# Patient Record
Sex: MALE | URBAN - METROPOLITAN AREA
[De-identification: names, ages, dates, MRNs, and addresses within clinical notes are randomized per-mention and may not be internally consistent; named-entity substitution may affect disease eponyms.]

---

## 2017-05-04 PROBLEM — E66.9 OBESITY: Status: ACTIVE | Noted: 2017-05-04

## 2017-05-04 PROBLEM — R05.9 COUGH: Status: ACTIVE | Noted: 2017-05-04

## 2017-05-04 PROBLEM — E78.49 FAMILIAL MULTIPLE LIPOPROTEIN-TYPE HYPERLIPIDEMIA: Status: ACTIVE | Noted: 2017-05-04

## 2017-05-04 PROBLEM — Z23 NEED FOR PROPHYLACTIC VACCINATION AND INOCULATION AGAINST INFLUENZA: Status: ACTIVE | Noted: 2017-05-04

## 2017-05-04 PROBLEM — L91.9 HYPERTROPHIC AND ATROPHIC CONDITION OF SKIN: Status: ACTIVE | Noted: 2017-05-04

## 2017-05-04 PROBLEM — Z00.00 ROUTINE GENERAL MEDICAL EXAMINATION AT A HEALTH CARE FACILITY: Status: ACTIVE | Noted: 2017-05-04

## 2017-05-04 PROBLEM — L90.9 HYPERTROPHIC AND ATROPHIC CONDITION OF SKIN: Status: ACTIVE | Noted: 2017-05-04

## 2017-05-04 PROBLEM — J30.9 ALLERGIC RHINITIS: Status: ACTIVE | Noted: 2017-05-04

## 2018-08-07 PROBLEM — Z23 NEED FOR PROPHYLACTIC VACCINATION AND INOCULATION AGAINST INFLUENZA: Status: RESOLVED | Noted: 2017-05-04 | Resolved: 2018-08-07

## 2018-08-07 PROBLEM — J30.9 ALLERGIC RHINITIS: Status: RESOLVED | Noted: 2017-05-04 | Resolved: 2018-08-07

## 2018-08-07 PROBLEM — R05.9 COUGH: Status: RESOLVED | Noted: 2017-05-04 | Resolved: 2018-08-07

## 2018-08-07 PROBLEM — L91.9 HYPERTROPHIC AND ATROPHIC CONDITION OF SKIN: Status: RESOLVED | Noted: 2017-05-04 | Resolved: 2018-08-07

## 2018-08-07 PROBLEM — L90.9 HYPERTROPHIC AND ATROPHIC CONDITION OF SKIN: Status: RESOLVED | Noted: 2017-05-04 | Resolved: 2018-08-07

## 2018-08-07 PROBLEM — Z00.00 ROUTINE GENERAL MEDICAL EXAMINATION AT A HEALTH CARE FACILITY: Status: RESOLVED | Noted: 2017-05-04 | Resolved: 2018-08-07

## 2021-04-03 ENCOUNTER — IMMUNIZATION (OUTPATIENT)
Dept: LAB | Age: 56
End: 2021-04-03

## 2021-04-03 DIAGNOSIS — Z23 NEED FOR VACCINATION: Primary | ICD-10-CM

## 2021-04-03 PROCEDURE — 91300 COVID 19 PFIZER-BIONTECH: CPT | Performed by: HOSPITALIST

## 2021-04-03 PROCEDURE — 0001A COVID 19 PFIZER-BIONTECH: CPT | Performed by: HOSPITALIST

## 2021-04-24 ENCOUNTER — IMMUNIZATION (OUTPATIENT)
Dept: LAB | Age: 56
End: 2021-04-24
Attending: HOSPITALIST

## 2021-04-24 DIAGNOSIS — Z23 NEED FOR VACCINATION: Primary | ICD-10-CM

## 2021-04-24 PROCEDURE — 91300 COVID 19 PFIZER-BIONTECH: CPT | Performed by: HOSPITALIST

## 2021-04-24 PROCEDURE — 0002A COVID 19 PFIZER-BIONTECH: CPT | Performed by: HOSPITALIST

## 2023-11-13 ENCOUNTER — ANESTHESIA EVENT (OUTPATIENT)
Dept: SURGERY | Facility: HOSPITAL | Age: 58
End: 2023-11-13
Payer: COMMERCIAL

## 2023-11-14 ENCOUNTER — HOSPITAL ENCOUNTER (OUTPATIENT)
Facility: HOSPITAL | Age: 58
Discharge: HOME | End: 2023-11-15
Attending: UROLOGY | Admitting: UROLOGY
Payer: COMMERCIAL

## 2023-11-14 ENCOUNTER — ANESTHESIA (OUTPATIENT)
Dept: SURGERY | Facility: HOSPITAL | Age: 58
End: 2023-11-14
Payer: COMMERCIAL

## 2023-11-14 PROBLEM — N13.8 BPH WITH URINARY OBSTRUCTION: Status: ACTIVE | Noted: 2023-11-14

## 2023-11-14 PROBLEM — N40.1 BPH WITH URINARY OBSTRUCTION: Status: ACTIVE | Noted: 2023-11-14

## 2023-11-14 LAB
ANION GAP SERPL CALC-SCNC: 5 MMOL/L (ref 0–18)
BUN BLD-MCNC: 12 MG/DL (ref 9–23)
CALCIUM BLD-MCNC: 8.8 MG/DL (ref 8.5–10.1)
CHLORIDE SERPL-SCNC: 106 MMOL/L (ref 98–112)
CO2 SERPL-SCNC: 26 MMOL/L (ref 21–32)
CREAT BLD-MCNC: 1.06 MG/DL
EGFRCR SERPLBLD CKD-EPI 2021: 82 ML/MIN/1.73M2 (ref 60–?)
ERYTHROCYTE [DISTWIDTH] IN BLOOD BY AUTOMATED COUNT: 13.2 %
GLUCOSE BLD-MCNC: 104 MG/DL (ref 70–99)
HCT VFR BLD AUTO: 46.2 %
HGB BLD-MCNC: 15.1 G/DL
MCH RBC QN AUTO: 28.2 PG (ref 26–34)
MCHC RBC AUTO-ENTMCNC: 32.7 G/DL (ref 31–37)
MCV RBC AUTO: 86.4 FL
OSMOLALITY SERPL CALC.SUM OF ELEC: 284 MOSM/KG (ref 275–295)
PLATELET # BLD AUTO: 150 10(3)UL (ref 150–450)
POTASSIUM SERPL-SCNC: 4.2 MMOL/L (ref 3.5–5.1)
RBC # BLD AUTO: 5.35 X10(6)UL
SODIUM SERPL-SCNC: 137 MMOL/L (ref 136–145)
WBC # BLD AUTO: 11.3 X10(3) UL (ref 4–11)

## 2023-11-14 PROCEDURE — 88305 TISSUE EXAM BY PATHOLOGIST: CPT | Performed by: UROLOGY

## 2023-11-14 PROCEDURE — 0TJB8ZZ INSPECTION OF BLADDER, VIA NATURAL OR ARTIFICIAL OPENING ENDOSCOPIC: ICD-10-PCS | Performed by: UROLOGY

## 2023-11-14 PROCEDURE — 85027 COMPLETE CBC AUTOMATED: CPT | Performed by: UROLOGY

## 2023-11-14 PROCEDURE — 80048 BASIC METABOLIC PNL TOTAL CA: CPT | Performed by: UROLOGY

## 2023-11-14 PROCEDURE — 0VT08ZZ RESECTION OF PROSTATE, VIA NATURAL OR ARTIFICIAL OPENING ENDOSCOPIC: ICD-10-PCS | Performed by: UROLOGY

## 2023-11-14 RX ORDER — SODIUM CHLORIDE 9 MG/ML
INJECTION, SOLUTION INTRAVENOUS CONTINUOUS
Status: DISCONTINUED | OUTPATIENT
Start: 2023-11-14 | End: 2023-11-15

## 2023-11-14 RX ORDER — CEFAZOLIN SODIUM/WATER 2 G/20 ML
2 SYRINGE (ML) INTRAVENOUS ONCE
Status: COMPLETED | OUTPATIENT
Start: 2023-11-14 | End: 2023-11-14

## 2023-11-14 RX ORDER — LIDOCAINE HYDROCHLORIDE 10 MG/ML
INJECTION, SOLUTION EPIDURAL; INFILTRATION; INTRACAUDAL; PERINEURAL AS NEEDED
Status: DISCONTINUED | OUTPATIENT
Start: 2023-11-14 | End: 2023-11-14 | Stop reason: SURG

## 2023-11-14 RX ORDER — HYDROMORPHONE HYDROCHLORIDE 1 MG/ML
0.4 INJECTION, SOLUTION INTRAMUSCULAR; INTRAVENOUS; SUBCUTANEOUS EVERY 2 HOUR PRN
Status: DISCONTINUED | OUTPATIENT
Start: 2023-11-14 | End: 2023-11-15

## 2023-11-14 RX ORDER — HEPARIN SODIUM 5000 [USP'U]/ML
5000 INJECTION, SOLUTION INTRAVENOUS; SUBCUTANEOUS EVERY 8 HOURS SCHEDULED
Status: DISCONTINUED | OUTPATIENT
Start: 2023-11-15 | End: 2023-11-15

## 2023-11-14 RX ORDER — ACETAMINOPHEN 500 MG
1000 TABLET ORAL ONCE AS NEEDED
Status: DISCONTINUED | OUTPATIENT
Start: 2023-11-14 | End: 2023-11-14 | Stop reason: HOSPADM

## 2023-11-14 RX ORDER — DEXAMETHASONE SODIUM PHOSPHATE 4 MG/ML
VIAL (ML) INJECTION AS NEEDED
Status: DISCONTINUED | OUTPATIENT
Start: 2023-11-14 | End: 2023-11-14 | Stop reason: SURG

## 2023-11-14 RX ORDER — SODIUM CHLORIDE, SODIUM LACTATE, POTASSIUM CHLORIDE, CALCIUM CHLORIDE 600; 310; 30; 20 MG/100ML; MG/100ML; MG/100ML; MG/100ML
INJECTION, SOLUTION INTRAVENOUS CONTINUOUS
Status: DISCONTINUED | OUTPATIENT
Start: 2023-11-14 | End: 2023-11-14

## 2023-11-14 RX ORDER — PROCHLORPERAZINE EDISYLATE 5 MG/ML
5 INJECTION INTRAMUSCULAR; INTRAVENOUS EVERY 8 HOURS PRN
Status: DISCONTINUED | OUTPATIENT
Start: 2023-11-14 | End: 2023-11-14 | Stop reason: HOSPADM

## 2023-11-14 RX ORDER — HYDROCODONE BITARTRATE AND ACETAMINOPHEN 10; 325 MG/1; MG/1
1 TABLET ORAL ONCE AS NEEDED
Status: DISCONTINUED | OUTPATIENT
Start: 2023-11-14 | End: 2023-11-14 | Stop reason: HOSPADM

## 2023-11-14 RX ORDER — ACETAMINOPHEN 500 MG
1000 TABLET ORAL ONCE
Status: DISCONTINUED | OUTPATIENT
Start: 2023-11-14 | End: 2023-11-14 | Stop reason: HOSPADM

## 2023-11-14 RX ORDER — MEPERIDINE HYDROCHLORIDE 25 MG/ML
12.5 INJECTION INTRAMUSCULAR; INTRAVENOUS; SUBCUTANEOUS AS NEEDED
Status: DISCONTINUED | OUTPATIENT
Start: 2023-11-14 | End: 2023-11-14 | Stop reason: HOSPADM

## 2023-11-14 RX ORDER — HYDROCODONE BITARTRATE AND ACETAMINOPHEN 10; 325 MG/1; MG/1
2 TABLET ORAL ONCE AS NEEDED
Status: DISCONTINUED | OUTPATIENT
Start: 2023-11-14 | End: 2023-11-14 | Stop reason: HOSPADM

## 2023-11-14 RX ORDER — ONDANSETRON 2 MG/ML
4 INJECTION INTRAMUSCULAR; INTRAVENOUS EVERY 6 HOURS PRN
Status: DISCONTINUED | OUTPATIENT
Start: 2023-11-14 | End: 2023-11-15

## 2023-11-14 RX ORDER — ACETAMINOPHEN 325 MG/1
650 TABLET ORAL EVERY 6 HOURS PRN
Status: DISCONTINUED | OUTPATIENT
Start: 2023-11-14 | End: 2023-11-15

## 2023-11-14 RX ORDER — MIDAZOLAM HYDROCHLORIDE 1 MG/ML
1 INJECTION INTRAMUSCULAR; INTRAVENOUS EVERY 5 MIN PRN
Status: DISCONTINUED | OUTPATIENT
Start: 2023-11-14 | End: 2023-11-14 | Stop reason: HOSPADM

## 2023-11-14 RX ORDER — HYDROMORPHONE HYDROCHLORIDE 1 MG/ML
0.4 INJECTION, SOLUTION INTRAMUSCULAR; INTRAVENOUS; SUBCUTANEOUS EVERY 5 MIN PRN
Status: DISCONTINUED | OUTPATIENT
Start: 2023-11-14 | End: 2023-11-14 | Stop reason: HOSPADM

## 2023-11-14 RX ORDER — OXYCODONE HYDROCHLORIDE 5 MG/1
10 TABLET ORAL EVERY 4 HOURS PRN
Status: DISCONTINUED | OUTPATIENT
Start: 2023-11-14 | End: 2023-11-15

## 2023-11-14 RX ORDER — HYDROMORPHONE HYDROCHLORIDE 1 MG/ML
0.2 INJECTION, SOLUTION INTRAMUSCULAR; INTRAVENOUS; SUBCUTANEOUS EVERY 5 MIN PRN
Status: DISCONTINUED | OUTPATIENT
Start: 2023-11-14 | End: 2023-11-14 | Stop reason: HOSPADM

## 2023-11-14 RX ORDER — ONDANSETRON 2 MG/ML
INJECTION INTRAMUSCULAR; INTRAVENOUS AS NEEDED
Status: DISCONTINUED | OUTPATIENT
Start: 2023-11-14 | End: 2023-11-14 | Stop reason: SURG

## 2023-11-14 RX ORDER — HYDROMORPHONE HYDROCHLORIDE 1 MG/ML
0.6 INJECTION, SOLUTION INTRAMUSCULAR; INTRAVENOUS; SUBCUTANEOUS EVERY 5 MIN PRN
Status: DISCONTINUED | OUTPATIENT
Start: 2023-11-14 | End: 2023-11-14 | Stop reason: HOSPADM

## 2023-11-14 RX ORDER — HYDROMORPHONE HYDROCHLORIDE 1 MG/ML
0.8 INJECTION, SOLUTION INTRAMUSCULAR; INTRAVENOUS; SUBCUTANEOUS EVERY 2 HOUR PRN
Status: DISCONTINUED | OUTPATIENT
Start: 2023-11-14 | End: 2023-11-15

## 2023-11-14 RX ORDER — LABETALOL HYDROCHLORIDE 5 MG/ML
5 INJECTION, SOLUTION INTRAVENOUS EVERY 5 MIN PRN
Status: DISCONTINUED | OUTPATIENT
Start: 2023-11-14 | End: 2023-11-14 | Stop reason: HOSPADM

## 2023-11-14 RX ORDER — ROCURONIUM BROMIDE 10 MG/ML
INJECTION, SOLUTION INTRAVENOUS AS NEEDED
Status: DISCONTINUED | OUTPATIENT
Start: 2023-11-14 | End: 2023-11-14 | Stop reason: SURG

## 2023-11-14 RX ORDER — ONDANSETRON 2 MG/ML
4 INJECTION INTRAMUSCULAR; INTRAVENOUS EVERY 6 HOURS PRN
Status: DISCONTINUED | OUTPATIENT
Start: 2023-11-14 | End: 2023-11-14 | Stop reason: HOSPADM

## 2023-11-14 RX ORDER — OXYCODONE HYDROCHLORIDE 5 MG/1
5 TABLET ORAL EVERY 4 HOURS PRN
Status: DISCONTINUED | OUTPATIENT
Start: 2023-11-14 | End: 2023-11-15

## 2023-11-14 RX ORDER — POLYETHYLENE GLYCOL 3350 17 G/17G
17 POWDER, FOR SOLUTION ORAL DAILY PRN
Status: DISCONTINUED | OUTPATIENT
Start: 2023-11-14 | End: 2023-11-15

## 2023-11-14 RX ORDER — SODIUM CHLORIDE, SODIUM LACTATE, POTASSIUM CHLORIDE, CALCIUM CHLORIDE 600; 310; 30; 20 MG/100ML; MG/100ML; MG/100ML; MG/100ML
INJECTION, SOLUTION INTRAVENOUS CONTINUOUS
Status: DISCONTINUED | OUTPATIENT
Start: 2023-11-14 | End: 2023-11-14 | Stop reason: HOSPADM

## 2023-11-14 RX ORDER — TAMSULOSIN HYDROCHLORIDE 0.4 MG/1
0.8 CAPSULE ORAL DAILY
Status: DISCONTINUED | OUTPATIENT
Start: 2023-11-15 | End: 2023-11-15

## 2023-11-14 RX ORDER — SENNOSIDES 8.6 MG
17.2 TABLET ORAL NIGHTLY PRN
Status: DISCONTINUED | OUTPATIENT
Start: 2023-11-14 | End: 2023-11-15

## 2023-11-14 RX ORDER — NALOXONE HYDROCHLORIDE 0.4 MG/ML
80 INJECTION, SOLUTION INTRAMUSCULAR; INTRAVENOUS; SUBCUTANEOUS AS NEEDED
Status: DISCONTINUED | OUTPATIENT
Start: 2023-11-14 | End: 2023-11-14 | Stop reason: HOSPADM

## 2023-11-14 RX ORDER — CEFAZOLIN SODIUM/WATER 2 G/20 ML
SYRINGE (ML) INTRAVENOUS
Status: DISPENSED
Start: 2023-11-14 | End: 2023-11-15

## 2023-11-14 RX ORDER — MELATONIN
3 NIGHTLY PRN
Status: DISCONTINUED | OUTPATIENT
Start: 2023-11-14 | End: 2023-11-15

## 2023-11-14 RX ADMIN — ROCURONIUM BROMIDE 20 MG: 10 INJECTION, SOLUTION INTRAVENOUS at 16:23:00

## 2023-11-14 RX ADMIN — LIDOCAINE HYDROCHLORIDE 50 MG: 10 INJECTION, SOLUTION EPIDURAL; INFILTRATION; INTRACAUDAL; PERINEURAL at 15:11:00

## 2023-11-14 RX ADMIN — ROCURONIUM BROMIDE 50 MG: 10 INJECTION, SOLUTION INTRAVENOUS at 15:11:00

## 2023-11-14 RX ADMIN — CEFAZOLIN SODIUM/WATER 2 G: 2 G/20 ML SYRINGE (ML) INTRAVENOUS at 15:13:00

## 2023-11-14 RX ADMIN — DEXAMETHASONE SODIUM PHOSPHATE 8 MG: 4 MG/ML VIAL (ML) INJECTION at 15:11:00

## 2023-11-14 RX ADMIN — ONDANSETRON 4 MG: 2 INJECTION INTRAMUSCULAR; INTRAVENOUS at 16:35:00

## 2023-11-14 RX ADMIN — SODIUM CHLORIDE, SODIUM LACTATE, POTASSIUM CHLORIDE, CALCIUM CHLORIDE: 600; 310; 30; 20 INJECTION, SOLUTION INTRAVENOUS at 16:50:00

## 2023-11-14 NOTE — ANESTHESIA PROCEDURE NOTES
Airway  Date/Time: 11/14/2023 3:13 PM  Urgency: elective    Airway not difficult    General Information and Staff    Patient location during procedure: OR  Anesthesiologist: Kati Hannon MD  Performed: anesthesiologist   Performed by: Kati Hannon MD  Authorized by: Kati Hannon MD      Indications and Patient Condition  Indications for airway management: anesthesia  Sedation level: deep  Preoxygenated: yes  Patient position: sniffing  Mask difficulty assessment: 1 - vent by mask    Final Airway Details  Final airway type: endotracheal airway      Successful airway: ETT  Cuffed: yes   Successful intubation technique: direct laryngoscopy  Endotracheal tube insertion site: oral  Blade: Batsheva  Blade size: #4  ETT size (mm): 7.5    Cormack-Lehane Classification: grade IIA - partial view of glottis  Placement verified by: capnometry   Measured from: lips  ETT to lips (cm): 22  Number of attempts at approach: 1

## 2023-11-14 NOTE — OPERATIVE REPORT
TRANSURETHRAL RESECTION OF THE PROSTATE (TURP) OPERATIVE REPORT    PATIENT NAME: Liza Griffith  YOB: 1965  DATE OF SERVICE: 11/14/2023    SURGEON:  Hortensia Nunez MD    ASSISTANT:  None    PREOPERATIVE DIAGNOSIS:  BPH with Obstruction and LUTS    POSTOPERATIVE DIAGNOSIS:  Same    PROCEDURE PERFORMED:  Cystoscopy  Transurethral Resection of the Prostate (TURP)    ASSISTANTS:  None    ANESTHESIA:  General    ANTIBIOTIC PROPHYLAXIS:  2g Ancef    SPECIMENS:  Prostate Tissue    DRAINS:  22F 3 way hematuria catheter (40mL in balloon)    ESTIMATED BLOOD LOSS:  Minimal    COMPLICATIONS:  No immediate complications     INDICATIONS FOR PROCEDURE:  Mr. Liza Griffith is a 63year old gentleman with a history of BPH with LUTS. He has been on tamsulosin 0.8mg nightly without much improvement in his bothersome LUTS. Prior cystoscopy showed trilobar hyperplasia with a moderate sized intravesical median lobe. We reviewed additional treatment options, including the addition of finasteride, or consideration of bladder outlet procedures. Based on his prostate anatomy, I have recommended that we proceed with a cystoscopy and TURP procedure, which he presents for today. We discussed the risks of the procedure including bleeding, infection, damage to the urologic structures, urinary incontinence, bladder neck contracture, urethral stricture formation, erectile dysfunction, or failure of the procedure to alleviate his voiding symptoms. He understand that most men have some degree of irritative voiding symptoms for up to 6-8 weeks post-operatively. DESCRIPTION OF PROCEDURE:  After reviewing the indications for the procedure, informed consent was reviewed and signed by the patient. The patient was brought to the operating room and placed in the supine position on the OR table. SCD's were applied and all pressure points were carefully padded. At this point, anesthesia was successfully induced.   The patient was then given the perioperative antibiotics listed above prior to the procedure. The patient was transferred to the dorsal lithotomy position and prepped and draped in the normal sterile fashion using betadine. We then performed an operative time out to confirm the correct patient, and procedure. Everyone in the room was in agreement. I first sequentially dilated the urethral meatus from 22F up to 28F using [employer]. I began by inserting a 26F continuous flow resectoscope into the urethra using a visual obturator. The urethra was without lesions or strictures. The prostatic urethral showed trilobar hyperplasia with a moderate sized median lobe. I performed a thorough cystoscopy which revealed no pathology. No tumors, stones, or other pathology was appreciated. The ureteral orifices were easily visible and effluxing clear urine. At this point, I used a bipolar resectoscope loop and scored the bladder mucosa, just distal to the ureteral orifices to help identify them during the resection. I then resected the patient's median lobe until it was flush with the floor of the bladder. I fulgurated his area to obtain careful hemostasis. I then resected the obstructing lateral lobes to the level of prostatic capsule bilaterally. I backed the scope up to the level of the veru montanum. Care was taken to not resect distally to the veru, to prevent any inadvertent injury to the external urethral sphincter. I continued to resect the lateral lobes until I was satisfied with the degree of resection. There was a wide open defect within the prostatic urethra. I then emptied all prostate tissue and this was sent to pathology. I carefully inspected for hemostasis, which appeared to be excellent. I ensured that all prostate tissue had been evacuated from the bladder lumen.     I identified the ureteral orifices bilaterally at the completion of the procedure and ensured they were still patent and effluxing clear urine. At this point, I inserted a 22F 3 way hematuria catheter and inflated the balloon with 40mL of sterile water. I hand irrigated the catheter several times and confirmed it irrigated easily. The patient was then started on a moderate CBI drip with efflux of light pink-tinged urine. The catheter was secured to his inner thigh on gentle traction using Mastisol and silk tape. This completed the procedure. He tolerated it well without complications. Please note that I was present for, and completed the entirety of this operation myself. PLAN:  Admit to overnight observation. Continue CBI overnight and wean as appropriate. Will stop CBI tomorrow as long as urine remains clear, and we will attempt a void trial tomorrow.         Isaias Garcia MD  [facility]  Department of Urology  Office: [phone]

## 2023-11-15 VITALS
HEIGHT: 72 IN | HEART RATE: 72 BPM | SYSTOLIC BLOOD PRESSURE: 126 MMHG | TEMPERATURE: 98 F | RESPIRATION RATE: 20 BRPM | WEIGHT: 263 LBS | BODY MASS INDEX: 35.62 KG/M2 | OXYGEN SATURATION: 92 % | DIASTOLIC BLOOD PRESSURE: 80 MMHG

## 2023-11-15 LAB
ANION GAP SERPL CALC-SCNC: 7 MMOL/L (ref 0–18)
BUN BLD-MCNC: 13 MG/DL (ref 9–23)
CALCIUM BLD-MCNC: 8.6 MG/DL (ref 8.5–10.1)
CHLORIDE SERPL-SCNC: 106 MMOL/L (ref 98–112)
CO2 SERPL-SCNC: 24 MMOL/L (ref 21–32)
CREAT BLD-MCNC: 0.84 MG/DL
EGFRCR SERPLBLD CKD-EPI 2021: 102 ML/MIN/1.73M2 (ref 60–?)
GLUCOSE BLD-MCNC: 118 MG/DL (ref 70–99)
HCT VFR BLD AUTO: 43.7 %
HGB BLD-MCNC: 14.9 G/DL
OSMOLALITY SERPL CALC.SUM OF ELEC: 285 MOSM/KG (ref 275–295)
POTASSIUM SERPL-SCNC: 4.1 MMOL/L (ref 3.5–5.1)
SODIUM SERPL-SCNC: 137 MMOL/L (ref 136–145)

## 2023-11-15 PROCEDURE — 85018 HEMOGLOBIN: CPT | Performed by: UROLOGY

## 2023-11-15 PROCEDURE — 80048 BASIC METABOLIC PNL TOTAL CA: CPT | Performed by: UROLOGY

## 2023-11-15 PROCEDURE — 85014 HEMATOCRIT: CPT | Performed by: UROLOGY

## 2023-11-15 RX ORDER — HYDROCODONE BITARTRATE AND ACETAMINOPHEN 5; 325 MG/1; MG/1
1 TABLET ORAL EVERY 6 HOURS PRN
Qty: 8 TABLET | Refills: 0 | Status: SHIPPED | OUTPATIENT
Start: 2023-11-15

## 2023-11-15 RX ORDER — DOCUSATE SODIUM 100 MG/1
100 CAPSULE, LIQUID FILLED ORAL 2 TIMES DAILY PRN
Qty: 20 CAPSULE | Refills: 0 | Status: SHIPPED | OUTPATIENT
Start: 2023-11-15

## 2023-11-15 NOTE — PROGRESS NOTES
NURSING DISCHARGE NOTE    Discharged Home via Wheelchair. Accompanied by Spouse and Support staff  Belongings Taken by patient/family. Discharge instructions and medications reviewed with patient.

## 2023-11-15 NOTE — PLAN OF CARE
Assumed care of patient s/p cystoscopy with TURP. Alert and oriented x 4,RA. Denies pain. Ongoing pain assessment. Medicated for reports of nausea with iv zofran. CBI in place. Notified Urologist on call  for urine turning pink and clearing up to clear yellow urine. IV fluids infusing to LH. Melatonin administered for sleep. Encouraged incentive spirometer use. Call light and belongings within reach. All needs mets. Staff rounding maintained for patient's needs.      Problem: Patient/Family Goals  Goal: Patient/Family Long Term Goal  Description: Patient's Long Term Goal: Follow up with urology as outpatient     Interventions:  - Free from urgency with urination.   - See additional Care Plan goals for specific interventions  Outcome: Progressing  Goal: Patient/Family Short Term Goal  Description: Patient's Short Term Goal: Void independently without mendoza    Interventions:   - Pass voiding trial  - See additional Care Plan goals for specific interventions  Outcome: Progressing     Problem: PAIN - ADULT  Goal: Verbalizes/displays adequate comfort level or patient's stated pain goal  Description: INTERVENTIONS:  - Encourage pt to monitor pain and request assistance  - Assess pain using appropriate pain scale  - Administer analgesics based on type and severity of pain and evaluate response  - Implement non-pharmacological measures as appropriate and evaluate response  - Consider cultural and social influences on pain and pain management  - Manage/alleviate anxiety  - Utilize distraction and/or relaxation techniques  - Monitor for opioid side effects  - Notify MD/LIP if interventions unsuccessful or patient reports new pain  - Anticipate increased pain with activity and pre-medicate as appropriate  Outcome: Progressing     Problem: RISK FOR INFECTION - ADULT  Goal: Absence of fever/infection during anticipated neutropenic period  Description: INTERVENTIONS  - Monitor WBC  - Administer growth factors as ordered  - Implement neutropenic guidelines  Outcome: Progressing

## 2023-11-15 NOTE — PLAN OF CARE
Pt alert and orieneted x 4   Assumed pt care at 0730  3 way mendoza removed at 10am per urology  Pt voiding freely-PVR 168ml  Mild pain-declined meds  Reg/Gen Diet  RA  Heparin VTE prophylaxis  PIV left hand 0.9 NS at 75ml/hr    Problem: RISK FOR INFECTION - ADULT  Goal: Absence of fever/infection during anticipated neutropenic period  Description: INTERVENTIONS  - Monitor WBC  - Administer growth factors as ordered  - Implement neutropenic guidelines  Outcome: Progressing     Problem: PAIN - ADULT  Goal: Verbalizes/displays adequate comfort level or patient's stated pain goal  Description: INTERVENTIONS:  - Encourage pt to monitor pain and request assistance  - Assess pain using appropriate pain scale  - Administer analgesics based on type and severity of pain and evaluate response  - Implement non-pharmacological measures as appropriate and evaluate response  - Consider cultural and social influences on pain and pain management  - Manage/alleviate anxiety  - Utilize distraction and/or relaxation techniques  - Monitor for opioid side effects  - Notify MD/LIP if interventions unsuccessful or patient reports new pain  - Anticipate increased pain with activity and pre-medicate as appropriate  Outcome: Progressing

## (undated) DEVICE — SYRINGE,TOOMEY,IRRIGATION,70CC,STERILE: Brand: MEDLINE

## (undated) DEVICE — SLEEVE COMPR M KNEE LEN SGL USE KENDALL SCD

## (undated) DEVICE — ADHESIVE LIQ 2/3ML VI MASTISOL

## (undated) DEVICE — CYSTO CDS-LF: Brand: MEDLINE INDUSTRIES, INC.

## (undated) DEVICE — SYRINGE MED 30ML STD CLR PLAS LL TIP N CTRL

## (undated) DEVICE — SKN PREP SPNG STKS PVP PNT STR: Brand: MEDLINE INDUSTRIES, INC.

## (undated) DEVICE — STERILE SYNTHETIC NEOPRENE POWDER-FREE SURGICAL GLOVES WITH NITRILE COATING, SMOOTH FINISH, STRAIGHT FINGER: Brand: PROTEXIS

## (undated) DEVICE — BAG URINE 4 LITER 600909

## (undated) DEVICE — 3M™ DURAPORE™ SURGICAL TAPE 1538-1, 1 INCH X 10 YARD (2,5CM X 9,1M), 12 ROLLS/BOX: Brand: 3M™ DURAPORE™

## (undated) DEVICE — SOLUTION IRRIG 1000ML ST H2O AQUALITE PLAS

## (undated) DEVICE — HF-RESECTION ELECTRODE PLASMALOOP LOOP, MEDIUM, 24 FR., 12°/16°, ESG TURIS: Brand: OLYMPUS

## (undated) DEVICE — SOLUTION IRRIG 3000ML 0.9% NACL FLX CONT

## (undated) NOTE — LETTER
OUTSIDE TESTING RESULT REQUEST     IMPORTANT: FOR YOUR IMMEDIATE ATTENTION  Please FAX all test results listed below to: [phone]     Testing already done on or about: 2023     * * * * If testing is NOT complete, arrange with patient A.S.A.P. * * * *      Patient Name: Kenney Rivera  Surgery Date: 2023  Medical Record: [id number]  CSN: [id number]  : 1965 - A: 63 y     Sex: male  Surgeon(s):  Eren Clemens MD  Procedure: CYSTOSCOPY, TRANSURETHRAL RESECTION OF THE PROSTATE  Anesthesia Type: General     Surgeon: Eren Clemens MD     The following Testing and Time Line are REQUIRED PER ANESTHESIA     EKG READ AND SIGNED WITHIN   90 days      Thank Giuseppe Bird,   Sent by: Hussain Parrish RN

## (undated) NOTE — LETTER
OUTSIDE TESTING RESULT REQUEST     IMPORTANT: FOR YOUR IMMEDIATE ATTENTION  Please FAX all test results listed below to: [phone]     Testing already done on or about: 23        * * * * If testing is NOT complete, arrange with patient A.S.A.P. * * * *      Patient Name: Jose Villafana  Surgery Date: 2023  Medical Record: [id number]  CSN: [id number]  : 1965 - A: 63 y     Sex: male  Surgeon(s):  Anjum Morrow MD  Procedure: CYSTOSCOPY, TRANSURETHRAL RESECTION OF THE PROSTATE  Anesthesia Type: General     Surgeon: Anjum Morrow MD     The following Testing and Time Line are REQUIRED PER ANESTHESIA     EKG READ AND SIGNED WITHIN   90 days  PT/INR within  30 days  PTT within  30 days  Urine C&S within  30 days      Thank You,   Sent by:MORGAN Gottlieb